# Patient Record
Sex: FEMALE | Race: ASIAN | NOT HISPANIC OR LATINO | URBAN - METROPOLITAN AREA
[De-identification: names, ages, dates, MRNs, and addresses within clinical notes are randomized per-mention and may not be internally consistent; named-entity substitution may affect disease eponyms.]

---

## 2019-07-31 ENCOUNTER — EMERGENCY (EMERGENCY)
Facility: HOSPITAL | Age: 64
LOS: 1 days | Discharge: HOME | End: 2019-07-31
Attending: INTERNAL MEDICINE | Admitting: INTERNAL MEDICINE
Payer: COMMERCIAL

## 2019-07-31 VITALS
RESPIRATION RATE: 18 BRPM | DIASTOLIC BLOOD PRESSURE: 62 MMHG | OXYGEN SATURATION: 100 % | TEMPERATURE: 98 F | SYSTOLIC BLOOD PRESSURE: 125 MMHG | HEART RATE: 80 BPM

## 2019-07-31 PROCEDURE — 73590 X-RAY EXAM OF LOWER LEG: CPT | Mod: 26,LT

## 2019-07-31 PROCEDURE — 99283 EMERGENCY DEPT VISIT LOW MDM: CPT

## 2019-07-31 PROCEDURE — 73562 X-RAY EXAM OF KNEE 3: CPT | Mod: 26,LT

## 2019-07-31 RX ORDER — ACETAMINOPHEN 500 MG
650 TABLET ORAL ONCE
Refills: 0 | Status: COMPLETED | OUTPATIENT
Start: 2019-07-31 | End: 2019-07-31

## 2019-07-31 RX ORDER — FUROSEMIDE 40 MG
1 TABLET ORAL
Qty: 14 | Refills: 0
Start: 2019-07-31 | End: 2019-08-13

## 2019-07-31 RX ORDER — TETANUS AND DIPHTHERIA TOXOIDS ADSORBED 2; 2 [LF]/.5ML; [LF]/.5ML
0.5 INJECTION INTRAMUSCULAR ONCE
Refills: 0 | Status: COMPLETED | OUTPATIENT
Start: 2019-07-31 | End: 2019-07-31

## 2019-07-31 RX ORDER — MOXIFLOXACIN HYDROCHLORIDE TABLETS, 400 MG 400 MG/1
1 TABLET, FILM COATED ORAL
Qty: 2 | Refills: 0
Start: 2019-07-31 | End: 2019-08-01

## 2019-07-31 RX ADMIN — Medication 650 MILLIGRAM(S): at 12:21

## 2019-07-31 RX ADMIN — TETANUS AND DIPHTHERIA TOXOIDS ADSORBED 0.5 MILLILITER(S): 2; 2 INJECTION INTRAMUSCULAR at 13:11

## 2019-07-31 NOTE — ED ADULT TRIAGE NOTE - CHIEF COMPLAINT QUOTE
pt had mechanical fall yesterday, c/o lft knee pain unable to ambulate this morning, pt with scrapes to forehead/nose/lips, c/o HA, denies ac use

## 2019-07-31 NOTE — ED PROVIDER NOTE - CHPI ED SYMPTOMS NEG
no tingling/no weakness/no loss of consciousness/no bleeding/no fever/no numbness/no vomiting/no deformity/no confusion

## 2019-07-31 NOTE — ED PROVIDER NOTE - NSFOLLOWUPINSTRUCTIONS_ED_ALL_ED_FT
Please follow up with your primary medical doctor within two days.  Rest and elevate the affected limb and apply ice.  You may bear weight on the affected leg as tolerated.  Please return the emergency department if your symptoms worsen or if you develop fever, chills, or worsening joint swelling.

## 2019-07-31 NOTE — ED PROVIDER NOTE - OBJECTIVE STATEMENT
64 F hx HLD presenting after a mechanical fall yesterday.  The patient states that she ran across the street to avoid traffic and tripped on the curb striking her forehead, nose, left wrist, and left knee against the ground.  She now complains of pain and swelling of the left knee and is unable to bear weight this morning although she was initially able to ambulate yesterday.  Mild discomfort to the head at the site of the abrasions but no generalized headache.  No aspirin use within the past 72 hours, no anticoagulants.  No nausea/vomiting or visual changes.  No dizziness.  No back pain.  Minor discomfort of the left wrist with preserved function today.  Deneis chest pain/nausea/vomiting/diaphoresis or other symptoms preceding the fall. 64 F hx HLD presenting after a mechanical fall yesterday.  The patient states that she ran across the street to avoid traffic and tripped on the curb striking her forehead, nose, left wrist, and left knee against the ground.  She now complains of pain and swelling of the left knee and is unable to bear weight this morning although she was initially able to ambulate yesterday.  Mild discomfort to the head at the site of the abrasions but no generalized headache.  No aspirin use within the past 72 hours, no anticoagulants.  No nausea/vomiting or visual changes.  No dizziness.  No back pain.  Minor discomfort of the left wrist with preserved function today.  Denies chest pain/nausea/vomiting/diaphoresis or other symptoms preceding the fall.

## 2019-07-31 NOTE — ED PROVIDER NOTE - CLINICAL SUMMARY MEDICAL DECISION MAKING FREE TEXT BOX
Kuschner: Mechanical fall with injury to the head and left knee.  Low energy and without symptoms concerning for ICH: below threshold for head CT.  No evidence of neck injury and below threshold to test.  Likely meniscal injury to the L knee but will obtain X-rays to R/O fracture.  Neurovascularly intact.  Plan for Tylenol for pain, Ice, X-rays, Tetanus

## 2019-07-31 NOTE — ED PROVIDER NOTE - PROGRESS NOTE DETAILS
No fracture on imagine.  Pt's pain improved and now able to move the left knee with good ROM.  Provided with crutches and ambulates well with them.  Bacitracin applied to facial abrasions.  return precautions and follow-up instructions discussed.

## 2019-07-31 NOTE — ED PROVIDER NOTE - MUSCULOSKELETAL MINIMAL EXAM
Tenderness with swelling over left knee with some preserved movement.  Mild mid tibial tenderness.  No foot tenderness.  Neurovascularly intact throughout./RANGE OF MOTION LIMITED

## 2021-06-12 ENCOUNTER — EMERGENCY (EMERGENCY)
Facility: HOSPITAL | Age: 66
LOS: 1 days | Discharge: HOME | End: 2021-06-12
Admitting: PERSONAL EMERGENCY RESPONSE ATTENDANT
Payer: MEDICARE

## 2021-06-12 VITALS
RESPIRATION RATE: 61 BRPM | SYSTOLIC BLOOD PRESSURE: 135 MMHG | OXYGEN SATURATION: 100 % | HEART RATE: 18 BPM | DIASTOLIC BLOOD PRESSURE: 58 MMHG | TEMPERATURE: 98 F

## 2021-06-12 PROCEDURE — 70450 CT HEAD/BRAIN W/O DYE: CPT | Mod: 26

## 2021-06-12 PROCEDURE — 99284 EMERGENCY DEPT VISIT MOD MDM: CPT

## 2021-06-12 RX ORDER — ACETAMINOPHEN 500 MG
650 TABLET ORAL ONCE
Refills: 0 | Status: COMPLETED | OUTPATIENT
Start: 2021-06-12 | End: 2021-06-12

## 2021-06-12 RX ADMIN — Medication 650 MILLIGRAM(S): at 16:35

## 2021-06-12 NOTE — ED ADULT TRIAGE NOTE - CHIEF COMPLAINT QUOTE
Pt states she hit the back of her head against a counter (was getting up from kneeling position) 1.5 days ago, and now has dizziness and pain to the area. Denies LOC, denies N/V, denies anticoagulant use.

## 2021-06-12 NOTE — ED PROVIDER NOTE - PROGRESS NOTE DETAILS
KARELY Dominique- Pt had her head CT but does not want to wait for the results. Pt states that she has to go home. Pt is a&ox3 and capable to make her own medical decisions. I explained that she should wait for the results as a brain bleed can cause her symptoms but she states she needs to leave. Pt understands the risks and would like to be dc.

## 2021-06-12 NOTE — ED PROVIDER NOTE - OBJECTIVE STATEMENT
67 y/o female pmh hld c/o head trauma x 3 days ago. Pt admits to hitting her head on something in her backyard after standing up quickly. Pt admits to "seeing stars", but did not have LOC. Pt states that she has been having headaches, feeling tired and having intermittent lightheadedness since. Pt is here today for eval. Pt denies cehst pain, sob, abd pain, v/d, numbness, tingling, neck pain, change in vision, confusion, syncope, fever or chills.

## 2021-06-12 NOTE — ED PROVIDER NOTE - CLINICAL SUMMARY MEDICAL DECISION MAKING FREE TEXT BOX
67 y/o female c/o concussion like symptoms after head injury x 3 days ago- pt is 66 so will obtain head CT. likely dc

## 2022-10-06 ENCOUNTER — EMERGENCY (EMERGENCY)
Facility: HOSPITAL | Age: 67
LOS: 1 days | Discharge: HOME | End: 2022-10-06
Attending: EMERGENCY MEDICINE | Admitting: EMERGENCY MEDICINE

## 2022-10-06 VITALS
RESPIRATION RATE: 18 BRPM | HEART RATE: 71 BPM | SYSTOLIC BLOOD PRESSURE: 122 MMHG | DIASTOLIC BLOOD PRESSURE: 55 MMHG | TEMPERATURE: 98 F | OXYGEN SATURATION: 99 %

## 2022-10-06 VITALS
DIASTOLIC BLOOD PRESSURE: 66 MMHG | SYSTOLIC BLOOD PRESSURE: 144 MMHG | OXYGEN SATURATION: 100 % | RESPIRATION RATE: 15 BRPM | HEART RATE: 72 BPM

## 2022-10-06 LAB
ALBUMIN SERPL ELPH-MCNC: 4.2 G/DL — SIGNIFICANT CHANGE UP (ref 3.3–5)
ALP SERPL-CCNC: 77 U/L — SIGNIFICANT CHANGE UP (ref 40–120)
ALT FLD-CCNC: 24 U/L — SIGNIFICANT CHANGE UP (ref 4–33)
ANION GAP SERPL CALC-SCNC: 8 MMOL/L — SIGNIFICANT CHANGE UP (ref 7–14)
AST SERPL-CCNC: 30 U/L — SIGNIFICANT CHANGE UP (ref 4–32)
BASOPHILS # BLD AUTO: 0.03 K/UL — SIGNIFICANT CHANGE UP (ref 0–0.2)
BASOPHILS NFR BLD AUTO: 0.6 % — SIGNIFICANT CHANGE UP (ref 0–2)
BILIRUB SERPL-MCNC: 0.2 MG/DL — SIGNIFICANT CHANGE UP (ref 0.2–1.2)
BUN SERPL-MCNC: 16 MG/DL — SIGNIFICANT CHANGE UP (ref 7–23)
CALCIUM SERPL-MCNC: 9.6 MG/DL — SIGNIFICANT CHANGE UP (ref 8.4–10.5)
CHLORIDE SERPL-SCNC: 104 MMOL/L — SIGNIFICANT CHANGE UP (ref 98–107)
CO2 SERPL-SCNC: 28 MMOL/L — SIGNIFICANT CHANGE UP (ref 22–31)
CREAT SERPL-MCNC: 0.63 MG/DL — SIGNIFICANT CHANGE UP (ref 0.5–1.3)
EGFR: 97 ML/MIN/1.73M2 — SIGNIFICANT CHANGE UP
EOSINOPHIL # BLD AUTO: 0.17 K/UL — SIGNIFICANT CHANGE UP (ref 0–0.5)
EOSINOPHIL NFR BLD AUTO: 3.2 % — SIGNIFICANT CHANGE UP (ref 0–6)
GLUCOSE SERPL-MCNC: 74 MG/DL — SIGNIFICANT CHANGE UP (ref 70–99)
HCT VFR BLD CALC: 38 % — SIGNIFICANT CHANGE UP (ref 34.5–45)
HGB BLD-MCNC: 11.8 G/DL — SIGNIFICANT CHANGE UP (ref 11.5–15.5)
IANC: 3.25 K/UL — SIGNIFICANT CHANGE UP (ref 1.8–7.4)
IMM GRANULOCYTES NFR BLD AUTO: 0.2 % — SIGNIFICANT CHANGE UP (ref 0–0.9)
LYMPHOCYTES # BLD AUTO: 1.45 K/UL — SIGNIFICANT CHANGE UP (ref 1–3.3)
LYMPHOCYTES # BLD AUTO: 27.3 % — SIGNIFICANT CHANGE UP (ref 13–44)
MCHC RBC-ENTMCNC: 25.7 PG — LOW (ref 27–34)
MCHC RBC-ENTMCNC: 31.1 GM/DL — LOW (ref 32–36)
MCV RBC AUTO: 82.8 FL — SIGNIFICANT CHANGE UP (ref 80–100)
MONOCYTES # BLD AUTO: 0.41 K/UL — SIGNIFICANT CHANGE UP (ref 0–0.9)
MONOCYTES NFR BLD AUTO: 7.7 % — SIGNIFICANT CHANGE UP (ref 2–14)
NEUTROPHILS # BLD AUTO: 3.25 K/UL — SIGNIFICANT CHANGE UP (ref 1.8–7.4)
NEUTROPHILS NFR BLD AUTO: 61 % — SIGNIFICANT CHANGE UP (ref 43–77)
NRBC # BLD: 0 /100 WBCS — SIGNIFICANT CHANGE UP (ref 0–0)
NRBC # FLD: 0 K/UL — SIGNIFICANT CHANGE UP (ref 0–0)
PLATELET # BLD AUTO: 216 K/UL — SIGNIFICANT CHANGE UP (ref 150–400)
POTASSIUM SERPL-MCNC: 4.1 MMOL/L — SIGNIFICANT CHANGE UP (ref 3.5–5.3)
POTASSIUM SERPL-SCNC: 4.1 MMOL/L — SIGNIFICANT CHANGE UP (ref 3.5–5.3)
PROT SERPL-MCNC: 7.1 G/DL — SIGNIFICANT CHANGE UP (ref 6–8.3)
RBC # BLD: 4.59 M/UL — SIGNIFICANT CHANGE UP (ref 3.8–5.2)
RBC # FLD: 15 % — HIGH (ref 10.3–14.5)
SODIUM SERPL-SCNC: 140 MMOL/L — SIGNIFICANT CHANGE UP (ref 135–145)
TROPONIN T, HIGH SENSITIVITY RESULT: 10 NG/L — SIGNIFICANT CHANGE UP
TSH SERPL-MCNC: 3.38 UIU/ML — SIGNIFICANT CHANGE UP (ref 0.27–4.2)
WBC # BLD: 5.32 K/UL — SIGNIFICANT CHANGE UP (ref 3.8–10.5)
WBC # FLD AUTO: 5.32 K/UL — SIGNIFICANT CHANGE UP (ref 3.8–10.5)

## 2022-10-06 PROCEDURE — 70498 CT ANGIOGRAPHY NECK: CPT | Mod: 26,MA

## 2022-10-06 PROCEDURE — 99285 EMERGENCY DEPT VISIT HI MDM: CPT

## 2022-10-06 PROCEDURE — 93010 ELECTROCARDIOGRAM REPORT: CPT

## 2022-10-06 PROCEDURE — 70496 CT ANGIOGRAPHY HEAD: CPT | Mod: 26,MA

## 2022-10-06 PROCEDURE — 72126 CT NECK SPINE W/DYE: CPT | Mod: 26,MA

## 2022-10-06 RX ORDER — LIDOCAINE 4 G/100G
1 CREAM TOPICAL
Qty: 30 | Refills: 0
Start: 2022-10-06 | End: 2022-11-04

## 2022-10-06 RX ORDER — CYCLOBENZAPRINE HYDROCHLORIDE 10 MG/1
1 TABLET, FILM COATED ORAL
Qty: 15 | Refills: 0
Start: 2022-10-06 | End: 2022-10-10

## 2022-10-06 RX ORDER — CYCLOBENZAPRINE HYDROCHLORIDE 10 MG/1
10 TABLET, FILM COATED ORAL ONCE
Refills: 0 | Status: COMPLETED | OUTPATIENT
Start: 2022-10-06 | End: 2022-10-06

## 2022-10-06 RX ORDER — ACETAMINOPHEN 500 MG
650 TABLET ORAL ONCE
Refills: 0 | Status: COMPLETED | OUTPATIENT
Start: 2022-10-06 | End: 2022-10-06

## 2022-10-06 RX ORDER — LIDOCAINE 4 G/100G
1 CREAM TOPICAL ONCE
Refills: 0 | Status: COMPLETED | OUTPATIENT
Start: 2022-10-06 | End: 2022-10-06

## 2022-10-06 RX ADMIN — LIDOCAINE 1 PATCH: 4 CREAM TOPICAL at 14:47

## 2022-10-06 RX ADMIN — Medication 650 MILLIGRAM(S): at 17:22

## 2022-10-06 RX ADMIN — CYCLOBENZAPRINE HYDROCHLORIDE 10 MILLIGRAM(S): 10 TABLET, FILM COATED ORAL at 17:22

## 2022-10-06 NOTE — ED ADULT TRIAGE NOTE - CHIEF COMPLAINT QUOTE
Patient has c/o dizziness and swelling to back of neck since Sunday. Tylenol helps but it comes back.

## 2022-10-06 NOTE — ED PROVIDER NOTE - OBJECTIVE STATEMENT
68 Y/O F PMH HLD denies PSH C/O dizziness she describes as both lightheadedness and room spinning in nature in addition to an associated off balance feeling for the past week. Pt states the lightheadedness is when she stands up and feels Pt states she has never had this before and denies H/O Vertigo. Pt states she developed a headache and neck pain 3 days ago. Pt states the headache pain was all around her head, states the HA was mild at onset and increased in severity, currently resolved, reached 8/10 in severity. Pt states she has suffered from headaches but not frequently. Pt also endorses atraumatic midline and R sided neck pain for the past 3 days, denies trauma, states the pain began while she was already awake. Pt denies CP, SOB or palpitations. Pt denies any other sx or acute complaints.

## 2022-10-06 NOTE — ED ADULT NURSE REASSESSMENT NOTE - NS ED NURSE REASSESS COMMENT FT1
AAOx4, no signs of distress, finally agreeable to taking medications, administered per orders, pending CT, fall precautions maintained

## 2022-10-06 NOTE — ED PROVIDER NOTE - NS ED ATTENDING STATEMENT MOD
This was a shared visit with the SULMA. I reviewed and verified the documentation and independently performed the documented:

## 2022-10-06 NOTE — ED ADULT NURSE NOTE - OBJECTIVE STATEMENT
Pt came in c/o right sided neck pain/swelling. She stated that it began Sunday and she also had a headache with this pain. She said that her pain is a 9/10 and she took Tylenol at home which helped for a little but the pain eventually came back. At this time she denied N/V and any vison changes. She also said that her pain radiates to the back of her had along the right side. When turning head left to right she said the pain is still present. 67 year old PMH of hyperlipemia came in c/o right sided neck pain/swelling. She stated that it began Sunday and she also had a headache with this pain. She said that her pain is a 9/10 and she took Tylenol at home which helped for a little but the pain eventually came back. At this time she denied N/V, dizziness and any vison changes. She also said that her pain radiates to the back of her head along the right side. When turning head left to right she said the pain is still present. Upon further assessment there was no swelling or redness noted on the right side. 20g in the right AC and labs were sent. Pt declined PO pain meds until after CT scan is complete, but accepted the lidocaine patch.

## 2022-10-06 NOTE — ED PROVIDER NOTE - PATIENT PORTAL LINK FT
You can access the FollowMyHealth Patient Portal offered by [facility] by registering at the following website: [email or web address]. By joining [facility]’s FollowMyHealth portal, you will also be able to view your health information using other applications (apps) compatible with our system.

## 2022-10-06 NOTE — ED PROVIDER NOTE - NSFOLLOWUPINSTRUCTIONS_ED_ALL_ED_FT
Take Cyclobenzaprine every 8 hours as needed for neck pain, use lidocaine patches daily. Advance activity as tolerated.  Continue all previously prescribed medications as directed.  Follow up with your primary care physician in 48-72 hours- bring copies of your results.  Return to the ER for worsening or persistent symptoms, and/or ANY NEW OR CONCERNING SYMPTOMS. THIS INCLUDES BUT IS NOT LIMITED TO SHORTNESS OF BREATH, CHEST PAIN, SLURRED SPEECH OR FOR ANY OTHER SYMPTOMS THAT CONCERN YOU. If you have issues obtaining follow up, please call: [phone] to obtain a doctor or specialist who takes your insurance in your area.  You may call [phone] to make an appointment with the internal medicine clinic.

## 2022-10-06 NOTE — ED PROVIDER NOTE - PROGRESS NOTE DETAILS
KARELY Desouza: Pt was reassessed, notes improved sx, no CP or SOB, will D/C with Cardiology follow up for screening, Neurology follow up via discharge lounge. Pt notes improved sx. Will D/C with follow up. KARELY Desouza: Pt was reassessed, notes improved sx, no CP or SOB, will D/C with Cardiology follow up for screening, Neurology follow up via discharge lounge. Pt notes improved sx, neck pain improved, no acute dizz. Will D/C with follow up, return precautions.

## 2022-10-06 NOTE — ED PROVIDER NOTE - CRANIAL NERVE AND PUPILLARY EXAM
5/5 strength and intact sensation bilaterally in upper and lower extremity. EOMI, no nystagmus, normal finger to nose./cranial nerves 2-12 intact

## 2022-10-06 NOTE — ED PROVIDER NOTE - CLINICAL SUMMARY MEDICAL DECISION MAKING FREE TEXT BOX
66 Y/O F PMH HLD denies PSH C/O dizziness she describes as both lightheadedness and room spinning in nature in addition to an associated off balance feeling for the past week. Pt states the lightheadedness is when she stands up and feels Pt states she has never had this before and denies H/O Vertigo. Pt states she developed a headache and neck pain 3 days ago. Plan is CT neck to eval for cervical radiculopathy, CT angio head and neck to eval for stenosis, aneurysm or other acute pathology which is of low suspicion. Clinically likely peripheral, no vertical nystagmus, dizz worsen with laying down and position changes, due to age, HLD and novel sx will obtain vessel imaging. Pt notes near syncope when standing, will check labs, TSH, EKG and troponin to eval for labs, anemia or electrolyte disturbance and will monitor on telemetry while in the ED for A-Fib or ectopy. Pt is well appearing, no acute distress. Cervicogenic headache and vertigo possible.

## 2022-10-06 NOTE — ED PROVIDER NOTE - NEUROLOGICAL SENSATION
Abdomen soft, nontender, nondistended, bowel sounds present in all 4 quadrants.
present and normal in 4 extremities

## 2022-12-06 NOTE — ED PROVIDER NOTE - PATIENT PORTAL LINK FT
[facility] department of [place] [address]  Phone: [phone]  Fax: [phone]    Johnson Ugarte        December 6, 2022     Patient: Parish Andrews   YOB: 1972   Date of Visit: 12/6/2022       To Whom It May Concern: It is my medical opinion that Sharon Grief should remain out of work until 12/28/22. If you have any questions or concerns, please don't hesitate to call.     Sincerely,        Lilliana Dover PA-C
You can access the FollowMyHealth Patient Portal offered by [facility] by registering at the following website: [email or web address]. By joining [facility]’s FollowMyHealth portal, you will also be able to view your health information using other applications (apps) compatible with our system.